# Patient Record
Sex: FEMALE | Race: WHITE
[De-identification: names, ages, dates, MRNs, and addresses within clinical notes are randomized per-mention and may not be internally consistent; named-entity substitution may affect disease eponyms.]

---

## 2017-05-03 ENCOUNTER — HOSPITAL ENCOUNTER (OUTPATIENT)
Dept: HOSPITAL 45 - C.PAPS | Age: 47
Discharge: HOME | End: 2017-05-03
Attending: OBSTETRICS & GYNECOLOGY
Payer: COMMERCIAL

## 2017-05-03 DIAGNOSIS — Z01.419: Primary | ICD-10-CM

## 2017-08-14 ENCOUNTER — HOSPITAL ENCOUNTER (OUTPATIENT)
Dept: HOSPITAL 45 - C.MAMM | Age: 47
Discharge: HOME | End: 2017-08-14
Attending: OBSTETRICS & GYNECOLOGY
Payer: COMMERCIAL

## 2017-08-14 DIAGNOSIS — Z12.31: Primary | ICD-10-CM

## 2017-08-14 DIAGNOSIS — Z98.82: ICD-10-CM

## 2017-08-14 NOTE — MAMMOGRAPHY REPORT
BILATERAL DIGITAL SCREENING MAMMOGRAM WITH CAD: 8/14/2017

CLINICAL HISTORY: Patient presents for routine screening.  S/P bilateral augmentation.  





TECHNIQUE: Bilateral CC and MLO views of the breasts with and without implant displacement views were
 obtained.  Current study was also evaluated with a Computer Aided Detection (CAD) system.  



COMPARISON: Comparison is made to exams dated:  8/7/2015 mammogram, 8/6/2014 mammogram, 8/5/2013 mamm
ogram, 7/3/2012 ultrasound - [facility], and 10/1/2008.   



BREAST COMPOSITION:  The tissue of both breasts is heterogeneously dense, which may obscure small mas
ses.  



FINDINGS: Bilateral subpectoral silicone implants are in place.  There is a prominent lobulation cristo
uring 12 mm along the inferior medial and anterior aspect of the left implant compared to the right. 
 This lobulation appears similar dating back to 2014, but is slightly more prominent when comparing t
o the 2013 and 2012 mammograms.  The implant is also more elongated in the superior and posterior asp
ect when compared to the contralateral right implant.  There is a stable jona-shaped biopsy marker cli
p in the 12:00 left breast, and a stable benign-appearing calcification in the right breast.  No new 
suspicious mass, architectural distortion or cluster of microcalcifications is seen.  



IMPRESSION:  ACR BI-RADS CATEGORY 1: NEGATIVE

1.  Stable bilateral mammograms, without mammographic evidence of malignancy. A 1 year screening mamm
ogram is recommended.

2.  Elongated shape of the left silicone implant compared to the right, and a prominent lobulated con
tour along the anterior lower inner aspect.  If there is concern for implant rupture, noncontrast hira
ast MRI is the test of choice.  



The patient will receive written notification of the results.  





Approximately 10% of breast cancers are not detected with mammography. A negative mammographic report
 should not delay biopsy if a clinically suggestive mass is present.



Rachel Bishop M.D.          

ay/:8/14/2017 13:51:10  



Imaging Technologist: Marlin SOLARES(KATHYA)(FLORENCIO), [facility]

letter sent: Normal 1/2  

BI-RADS Code: ACR BI-RADS Category 1: Negative

## 2018-08-15 ENCOUNTER — HOSPITAL ENCOUNTER (OUTPATIENT)
Dept: HOSPITAL 45 - C.MAMM | Age: 48
Discharge: HOME | End: 2018-08-15
Attending: OBSTETRICS & GYNECOLOGY
Payer: COMMERCIAL

## 2018-08-15 DIAGNOSIS — N64.89: ICD-10-CM

## 2018-08-15 DIAGNOSIS — Z98.82: ICD-10-CM

## 2018-08-15 DIAGNOSIS — Z12.31: Primary | ICD-10-CM

## 2018-08-16 NOTE — MAMMOGRAPHY REPORT
BILATERAL DIGITAL SCREENING MAMMOGRAM TOMOSYNTHESIS WITH CAD: 8/15/2018

CLINICAL HISTORY: Patient presents for routine screening. S/P bilateral augmentation.  





TECHNIQUE: The study was acquired using full field digital technology and interpreted from soft copy.
 Breast tomosynthesis in addition to standard 2D mammography was performed. Current study was also ev
aluated with a Computer Aided Detection (CAD) system.  



COMPARISON: Comparison is made to exams dated:  8/14/2017 mammogram, 8/11/2016 mammogram, 8/7/2015 ma
mmogram, 8/6/2014 mammogram, 8/5/2013 mammogram, and 7/3/2012 mammogram - [facility].   

BREAST COMPOSITION: The tissue of both breasts is extremely dense, which lowers the sensitivity of ma
mmography.  



FINDINGS: There are newly visualized nodular asymmetries in the left breast in the middle one third o
f the breast, slightly lateral to the posterior nipple line on the implant displaced left cc view (to
mosynthesis slice 10/43), measuring 6.6 mm.  A 9 mm nodular asymmetry is seen in the posterior left b
reast along the posterior nipple line on the implant displaced MLO view and a third nodular asymmetry
 measuring 4.8 mm is seen in the anterior subareolar left breast on the left MLO implant displaced vi
ew.  Further characterization with targeted left breast ultrasound is recommended.



There is a stable biopsy marker clip in the left breast.  Bilateral subpectoral silicone implants are
 stable, including lobulated contour of the left implant in the lower inner aspect, which appears sim
ilar dating back to at least 2013 and extracapsular silicone cannot be excluded.



IMPRESSION: ACR BI-RADS CATEGORY 0: INCOMPLETE EVALUATION: NEED ADDITIONAL IMAGING EVALUATION

1.  Left breast nodular asymmetries need additional imaging evaluation.

2.  Lobulated contour of the lower inner left implant could represent extracapsular silicone.  Noncon
trast MRI of the breasts is the the test of choice for assessing implant integrity.

The patient will be called to schedule an appointment.  





Some breast cancers are not detected with mammography. A negative mammographic report should not jitendra
y biopsy if a clinically suggestive mass is present.



Rachel Bishop M.D.          

ay/:8/15/2018 16:11:24  



Imaging Technologist: RT Brooks(KATHYA)(M), [facility]

letter sent: Addl Imaging 0  

BI-RADS Code: ACR BI-RADS Category 0: Incomplete Evaluation: Need Additional Imaging Evaluation

## 2018-08-24 ENCOUNTER — HOSPITAL ENCOUNTER (OUTPATIENT)
Dept: HOSPITAL 45 - C.MAMM | Age: 48
Discharge: HOME | End: 2018-08-24
Attending: OBSTETRICS & GYNECOLOGY
Payer: COMMERCIAL

## 2018-08-24 DIAGNOSIS — N60.02: Primary | ICD-10-CM

## 2018-08-24 NOTE — MAMMOGRAPHY REPORT
ULTRASOUND OF LEFT BREAST: 8/24/2018

CLINICAL HISTORY: Callback from screening mammogram for left breast asymmetries.  





COMPARISON: Comparison is made to exams dated:  8/15/2018 mammogram, 8/11/2016 mammogram, 8/7/2015 ma
mmogram - [facility], 10/1/2008, 8/14/2017 mammogram, and 8/6/2014 mammogram - [facility].   



Findings: Real-time, high-resolution targeted ultrasound was performed of the left breast in the louie
on of the mammographic asymmetries.  In the left 1:00 breast, 9 cm from the nipple, there is an oval 
circumscribed hypoechoic mass with central internal vascularity which measures 7 x 6 x 4 mm.  This is
 felt to correspond with the reniform mass within the left superior posterior breast and has the appe
arance of an intramammary lymph node.  In the left breast at 1:00, 5 cm from the nipple, there is a c
ircumscribed round partially anechoic and partially hypoechoic mass which measures 4 by 4 x 3 mm. in 
the left 5:00 breast, 6 cm from the nipple, there are 2 adjacent oval circumscribed partially anechoi
c and partially hypoechoic masses, 1 of which measures 5 x 4 x 7 mm and the other of which measures 5
 x 5 x 3 mm.  In the left 5:00 breast, 9 cm from the nipple, there is an oval anechoic mass which domo
sures 9 mm.  Another anechoic circumscribed round 6 x 5 x 6 mm mass is seen within the left 12:00 sub
areolar breast.  Another anechoic circumscribed 4 mm mass is seen within the left 12:00 breast, 2 cm 
from the nipple.  The masses are all circumscribed and have the appearance of benign simple and compl
icated cysts.  These cysts are felt to correspond with the other 2 mammographic asymmetries.  No susp
icious solid masses were evident.









IMPRESSION: ACR BI-RADS CATEGORY 2: BENIGN 

Multiple small circumscribed anechoic and hypoechoic masses seen within the left breast on targeted u
ltrasound, consistent with benign simple and complicated cysts.  These correspond with the mammograph
ic findings.  There is no targeted sonographic evidence of malignancy in the left breast.  



A 1 year screening mammogram is recommended.(08/25/2019)  The patient was verbally notified of the re
sults.



Janae Paul M.D.  

ah/:8/24/2018 09:33:28  



Imaging Technologist: RT Licha(KATHYA)(M), [facility]

letter sent: Normal 1/2  

BI-RADS Code: ACR BI-RADS Category 2: Benign